# Patient Record
Sex: FEMALE | Race: BLACK OR AFRICAN AMERICAN
[De-identification: names, ages, dates, MRNs, and addresses within clinical notes are randomized per-mention and may not be internally consistent; named-entity substitution may affect disease eponyms.]

---

## 2018-10-19 ENCOUNTER — HOSPITAL ENCOUNTER (OUTPATIENT)
Dept: HOSPITAL 63 - CT | Age: 28
Discharge: HOME | End: 2018-10-19
Attending: NURSE PRACTITIONER
Payer: COMMERCIAL

## 2018-10-19 DIAGNOSIS — J32.8: ICD-10-CM

## 2018-10-19 DIAGNOSIS — I10: ICD-10-CM

## 2018-10-19 DIAGNOSIS — R55: Primary | ICD-10-CM

## 2018-10-19 DIAGNOSIS — Z88.8: ICD-10-CM

## 2018-10-19 PROCEDURE — 70450 CT HEAD/BRAIN W/O DYE: CPT
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
## 2020-09-18 ENCOUNTER — HOSPITAL ENCOUNTER (EMERGENCY)
Dept: HOSPITAL 63 - ER | Age: 30
Discharge: HOME | End: 2020-09-18
Payer: COMMERCIAL

## 2020-09-18 VITALS — DIASTOLIC BLOOD PRESSURE: 84 MMHG | SYSTOLIC BLOOD PRESSURE: 152 MMHG

## 2020-09-18 VITALS — BODY MASS INDEX: 21.09 KG/M2 | HEIGHT: 63 IN | WEIGHT: 119.05 LBS
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
## 2020-09-18 DIAGNOSIS — T22.211A: ICD-10-CM
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
## 2020-09-18 DIAGNOSIS — F17.200: ICD-10-CM

## 2020-09-18 DIAGNOSIS — T22.212A: Primary | ICD-10-CM
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
## 2020-09-18 PROCEDURE — 90715 TDAP VACCINE 7 YRS/> IM: CPT

## 2020-09-18 PROCEDURE — 99283 EMERGENCY DEPT VISIT LOW MDM: CPT

## 2020-09-18 PROCEDURE — 90471 IMMUNIZATION ADMIN: CPT

## 2020-09-18 PROCEDURE — 16020 DRESS/DEBRID P-THICK BURN S: CPT
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
